# Patient Record
Sex: FEMALE | Race: WHITE | NOT HISPANIC OR LATINO
[De-identification: names, ages, dates, MRNs, and addresses within clinical notes are randomized per-mention and may not be internally consistent; named-entity substitution may affect disease eponyms.]

---

## 2017-10-19 ENCOUNTER — CHARTING TRANS (OUTPATIENT)
Dept: OTHER | Age: 27
End: 2017-10-19

## 2017-10-22 ENCOUNTER — CHARTING TRANS (OUTPATIENT)
Dept: OTHER | Age: 27
End: 2017-10-22

## 2017-10-30 ENCOUNTER — CHARTING TRANS (OUTPATIENT)
Dept: OTHER | Age: 27
End: 2017-10-30

## 2017-11-02 ENCOUNTER — CHARTING TRANS (OUTPATIENT)
Dept: OTHER | Age: 27
End: 2017-11-02

## 2019-02-08 ENCOUNTER — WALK IN (OUTPATIENT)
Dept: URGENT CARE | Age: 29
End: 2019-02-08

## 2019-02-08 VITALS
RESPIRATION RATE: 16 BRPM | TEMPERATURE: 98.2 F | SYSTOLIC BLOOD PRESSURE: 94 MMHG | HEART RATE: 100 BPM | WEIGHT: 122 LBS | BODY MASS INDEX: 20.33 KG/M2 | DIASTOLIC BLOOD PRESSURE: 60 MMHG | HEIGHT: 65 IN

## 2019-02-08 DIAGNOSIS — J02.9 PHARYNGITIS, UNSPECIFIED ETIOLOGY: Primary | ICD-10-CM

## 2019-02-08 LAB
INTERNAL PROCEDURAL CONTROLS ACCEPTABLE: YES
S PYO AG THROAT QL IA.RAPID: NEGATIVE

## 2019-02-08 PROCEDURE — X0943 AMG SELF PAY VISIT: HCPCS | Performed by: NURSE PRACTITIONER

## 2019-02-08 RX ORDER — NORETHINDRONE ACETATE AND ETHINYL ESTRADIOL 1; .02 MG/1; MG/1
1 TABLET ORAL DAILY
COMMUNITY

## 2019-02-08 ASSESSMENT — ENCOUNTER SYMPTOMS
FATIGUE: 1
COUGH: 0
CHILLS: 1
GASTROINTESTINAL NEGATIVE: 1
RESPIRATORY NEGATIVE: 1
FEVER: 1
SORE THROAT: 1
SINUS PAIN: 0
EYES NEGATIVE: 1
SINUS PRESSURE: 0
HEADACHES: 1
SWOLLEN GLANDS: 0
NAUSEA: 0
RHINORRHEA: 0

## 2022-11-14 ENCOUNTER — OFFICE VISIT (OUTPATIENT)
Dept: OBGYN CLINIC | Facility: CLINIC | Age: 32
End: 2022-11-14
Payer: COMMERCIAL

## 2022-11-14 VITALS
SYSTOLIC BLOOD PRESSURE: 120 MMHG | HEIGHT: 65 IN | BODY MASS INDEX: 22.56 KG/M2 | WEIGHT: 135.38 LBS | DIASTOLIC BLOOD PRESSURE: 72 MMHG

## 2022-11-14 DIAGNOSIS — Z32.01 PREGNANCY EXAMINATION OR TEST, POSITIVE RESULT: ICD-10-CM

## 2022-11-14 DIAGNOSIS — N83.209 CYST OF OVARY, UNSPECIFIED LATERALITY: ICD-10-CM

## 2022-11-14 DIAGNOSIS — N92.6 MISSED MENSES: Primary | ICD-10-CM

## 2022-11-14 LAB
CONTROL LINE PRESENT WITH A CLEAR BACKGROUND (YES/NO): YES YES/NO
PREGNANCY TEST, URINE: POSITIVE

## 2022-11-14 PROCEDURE — 87591 N.GONORRHOEAE DNA AMP PROB: CPT | Performed by: OBSTETRICS & GYNECOLOGY

## 2022-11-14 PROCEDURE — 87491 CHLMYD TRACH DNA AMP PROBE: CPT | Performed by: OBSTETRICS & GYNECOLOGY

## 2022-11-14 PROCEDURE — 87086 URINE CULTURE/COLONY COUNT: CPT | Performed by: OBSTETRICS & GYNECOLOGY

## 2022-11-15 LAB
C TRACH DNA SPEC QL NAA+PROBE: NEGATIVE
N GONORRHOEA DNA SPEC QL NAA+PROBE: NEGATIVE

## 2022-11-16 ENCOUNTER — MED REC SCAN ONLY (OUTPATIENT)
Dept: OBGYN CLINIC | Facility: CLINIC | Age: 32
End: 2022-11-16

## 2022-11-16 DIAGNOSIS — Z34.01 ENCOUNTER FOR SUPERVISION OF NORMAL FIRST PREGNANCY IN FIRST TRIMESTER: Primary | ICD-10-CM

## 2022-11-22 ENCOUNTER — NURSE ONLY (OUTPATIENT)
Dept: OBGYN CLINIC | Facility: CLINIC | Age: 32
End: 2022-11-22
Payer: COMMERCIAL

## 2022-11-22 ENCOUNTER — LAB ENCOUNTER (OUTPATIENT)
Dept: LAB | Age: 32
End: 2022-11-22
Attending: OBSTETRICS & GYNECOLOGY
Payer: COMMERCIAL

## 2022-11-22 DIAGNOSIS — Z34.01 ENCOUNTER FOR SUPERVISION OF NORMAL FIRST PREGNANCY IN FIRST TRIMESTER: ICD-10-CM

## 2022-11-22 DIAGNOSIS — Z34.01 ENCOUNTER FOR SUPERVISION OF NORMAL FIRST PREGNANCY IN FIRST TRIMESTER: Primary | ICD-10-CM

## 2022-11-22 LAB
ANTIBODY SCREEN: NEGATIVE
BASOPHILS # BLD AUTO: 0.03 X10(3) UL (ref 0–0.2)
BASOPHILS NFR BLD AUTO: 0.3 %
DEPRECATED RDW RBC AUTO: 39.7 FL (ref 35.1–46.3)
EOSINOPHIL # BLD AUTO: 0.1 X10(3) UL (ref 0–0.7)
EOSINOPHIL NFR BLD AUTO: 1.1 %
ERYTHROCYTE [DISTWIDTH] IN BLOOD BY AUTOMATED COUNT: 12 % (ref 11–15)
GLUCOSE 1H P GLC SERPL-MCNC: 126 MG/DL
HBV SURFACE AG SER-ACNC: <0.1 [IU]/L
HBV SURFACE AG SERPL QL IA: NONREACTIVE
HCT VFR BLD AUTO: 35.5 %
HCV AB SERPL QL IA: NONREACTIVE
HGB BLD-MCNC: 12.1 G/DL
IMM GRANULOCYTES # BLD AUTO: 0.04 X10(3) UL (ref 0–1)
IMM GRANULOCYTES NFR BLD: 0.5 %
LYMPHOCYTES # BLD AUTO: 1.97 X10(3) UL (ref 1–4)
LYMPHOCYTES NFR BLD AUTO: 22.5 %
MCH RBC QN AUTO: 30.7 PG (ref 26–34)
MCHC RBC AUTO-ENTMCNC: 34.1 G/DL (ref 31–37)
MCV RBC AUTO: 90.1 FL
MONOCYTES # BLD AUTO: 0.37 X10(3) UL (ref 0.1–1)
MONOCYTES NFR BLD AUTO: 4.2 %
NEUTROPHILS # BLD AUTO: 6.25 X10 (3) UL (ref 1.5–7.7)
NEUTROPHILS # BLD AUTO: 6.25 X10(3) UL (ref 1.5–7.7)
NEUTROPHILS NFR BLD AUTO: 71.4 %
PLATELET # BLD AUTO: 293 10(3)UL (ref 150–450)
RBC # BLD AUTO: 3.94 X10(6)UL
RH BLOOD TYPE: POSITIVE
RUBV IGG SER QL: POSITIVE
RUBV IGG SER-ACNC: >500 IU/ML (ref 10–?)
WBC # BLD AUTO: 8.8 X10(3) UL (ref 4–11)

## 2022-11-22 PROCEDURE — 87389 HIV-1 AG W/HIV-1&-2 AB AG IA: CPT

## 2022-11-22 PROCEDURE — 83020 HEMOGLOBIN ELECTROPHORESIS: CPT

## 2022-11-22 PROCEDURE — 87086 URINE CULTURE/COLONY COUNT: CPT

## 2022-11-22 PROCEDURE — 85025 COMPLETE CBC W/AUTO DIFF WBC: CPT

## 2022-11-22 PROCEDURE — 86803 HEPATITIS C AB TEST: CPT

## 2022-11-22 PROCEDURE — 83021 HEMOGLOBIN CHROMOTOGRAPHY: CPT

## 2022-11-22 PROCEDURE — 86780 TREPONEMA PALLIDUM: CPT

## 2022-11-22 PROCEDURE — 86901 BLOOD TYPING SEROLOGIC RH(D): CPT

## 2022-11-22 PROCEDURE — 86850 RBC ANTIBODY SCREEN: CPT

## 2022-11-22 PROCEDURE — 86592 SYPHILIS TEST NON-TREP QUAL: CPT

## 2022-11-22 PROCEDURE — 36415 COLL VENOUS BLD VENIPUNCTURE: CPT

## 2022-11-22 PROCEDURE — 87340 HEPATITIS B SURFACE AG IA: CPT

## 2022-11-22 PROCEDURE — 82950 GLUCOSE TEST: CPT

## 2022-11-22 PROCEDURE — 86900 BLOOD TYPING SEROLOGIC ABO: CPT

## 2022-11-22 PROCEDURE — 86762 RUBELLA ANTIBODY: CPT

## 2022-11-22 RX ORDER — MULTIVITAMIN WITH IRON
250 TABLET ORAL
COMMUNITY

## 2022-11-22 RX ORDER — CHOLECALCIFEROL (VITAMIN D3) 25 MCG
1 TABLET,CHEWABLE ORAL DAILY
COMMUNITY

## 2022-11-22 RX ORDER — MELATONIN
1000 DAILY
COMMUNITY

## 2022-11-22 NOTE — PROGRESS NOTES
Pt is a   here today for [employer]. Pregnancy Confirmation apt with:  with MA    LMP:      US:  (9w5d)    Working TIMOTHY: 23    Pre  BMI: 21.63    Medical Hx significant for: cervical ectropion    Obstetrical Hx significant for: NA    Surgical Hx significant for: right knee surgery ()    EPDS score: 5    Early GTT screening: meets criteria, GTT ordered. (Pt's dad is diabetic.) Drawn     Preeclampsia prevention screening: does not meet criteria. OUD Screening: Patient has answered NO to 5p questions and has no  risk factors. Patient given \"What Pregnant Women Need to Know\" handout. Educational material reviewed with patient: Prenatal care, nutrition, weight gain recommendations, travel, exercise, intercourse, pregnancy changes, safe medications, pregnancy and work, fetal movement, labor and  labor, warning signs, food safety, tdap, cord blood, breastfeeding, circumcision, and Group B strep. Pt agrees to blood transfusion if needed: Yes    PN labs ordered: Yes; drawn     Optional genetic screening discussed. Pt desires foresight and prequel: drawn on     [facility] Media Policy: Reviewed and verbalized understanding.      NOB appt:  with MA    Lab appt:

## 2022-11-23 LAB
HGB A2 MFR BLD: 2.9 % (ref 1.5–3.5)
HGB PNL BLD ELPH: 97.1 % (ref 95.5–100)

## 2022-11-26 LAB — RAPID PLASMA REAGIN (RPR): NON REACTIVE

## 2022-11-27 LAB — TREPONEMA PALLIDUM AB BY TP-PA: NON REACTIVE

## 2022-11-30 DIAGNOSIS — Z34.01 ENCOUNTER FOR SUPERVISION OF NORMAL FIRST PREGNANCY IN FIRST TRIMESTER: Primary | ICD-10-CM

## 2022-12-01 ENCOUNTER — TELEPHONE (OUTPATIENT)
Dept: OBGYN CLINIC | Facility: CLINIC | Age: 32
End: 2022-12-01

## 2022-12-01 NOTE — TELEPHONE ENCOUNTER
Call transferred, informed pt of + Pompe, biotinidase and +carnitine. Informed  can be tested with Saliva kit here at [place] [address]. Per pt  is coming to visit on Monday. Informed pt of neg trisomies/microdeletions, gender (male) in envelope. Pt agrees.

## 2022-12-05 ENCOUNTER — MED REC SCAN ONLY (OUTPATIENT)
Dept: OBGYN CLINIC | Facility: CLINIC | Age: 32
End: 2022-12-05

## 2022-12-05 ENCOUNTER — INITIAL PRENATAL (OUTPATIENT)
Dept: OBGYN CLINIC | Facility: CLINIC | Age: 32
End: 2022-12-05
Payer: COMMERCIAL

## 2022-12-05 VITALS
DIASTOLIC BLOOD PRESSURE: 72 MMHG | HEIGHT: 65 IN | BODY MASS INDEX: 22.96 KG/M2 | WEIGHT: 137.81 LBS | SYSTOLIC BLOOD PRESSURE: 120 MMHG

## 2022-12-05 DIAGNOSIS — Z36.9 UNSPECIFIED ANTENATAL SCREENING: Primary | ICD-10-CM

## 2022-12-05 DIAGNOSIS — Z14.8 GENETIC CARRIER OF OTHER DISEASE: ICD-10-CM

## 2022-12-05 LAB
GLUCOSE (URINE DIPSTICK): NEGATIVE MG/DL
MULTISTIX LOT#: NORMAL NUMERIC
PROTEIN (URINE DIPSTICK): NEGATIVE MG/DL

## 2022-12-15 ENCOUNTER — PATIENT MESSAGE (OUTPATIENT)
Dept: OBGYN CLINIC | Facility: CLINIC | Age: 32
End: 2022-12-15

## 2022-12-15 DIAGNOSIS — Z14.8 GENETIC CARRIER: Primary | ICD-10-CM

## 2022-12-16 ENCOUNTER — TELEPHONE (OUTPATIENT)
Dept: GENETICS | Facility: HOSPITAL | Age: 32
End: 2022-12-16

## 2022-12-16 NOTE — TELEPHONE ENCOUNTER
CHRISTIANM for patient advising I canceled her appointment.   I advised her to call to reschedule with Karlos Carrion or Rose Marie Romero

## 2022-12-19 ENCOUNTER — PATIENT MESSAGE (OUTPATIENT)
Dept: OBGYN CLINIC | Facility: CLINIC | Age: 32
End: 2022-12-19

## 2022-12-19 NOTE — TELEPHONE ENCOUNTER
From: Yoshi Romo  To: Feli Timmons MD  Sent: 12/19/2022 9:53 AM CST  Subject: COVID Positive     Good morning,     Last night I started feeling chills and body aches. This morning I took an at-home COVID test and it is positive. My temperature is currently 99.7. Can you please advise on what medication I should take and when, such as how high my fever should be before taking something? I have not taken any medication yet. Thank you!

## 2023-01-03 ENCOUNTER — LAB ENCOUNTER (OUTPATIENT)
Dept: LAB | Facility: REFERENCE LAB | Age: 33
End: 2023-01-03
Attending: OBSTETRICS & GYNECOLOGY
Payer: COMMERCIAL

## 2023-01-03 ENCOUNTER — HOSPITAL ENCOUNTER (OUTPATIENT)
Dept: ULTRASOUND IMAGING | Age: 33
Discharge: HOME | End: 2023-01-03
Attending: OBSTETRICS & GYNECOLOGY
Payer: COMMERCIAL

## 2023-01-03 ENCOUNTER — ROUTINE PRENATAL (OUTPATIENT)
Dept: OBGYN CLINIC | Facility: CLINIC | Age: 33
End: 2023-01-03
Payer: COMMERCIAL

## 2023-01-03 VITALS
BODY MASS INDEX: 23.49 KG/M2 | SYSTOLIC BLOOD PRESSURE: 120 MMHG | HEIGHT: 65 IN | WEIGHT: 141 LBS | DIASTOLIC BLOOD PRESSURE: 64 MMHG

## 2023-01-03 DIAGNOSIS — N83.209 CYST OF OVARY, UNSPECIFIED LATERALITY: ICD-10-CM

## 2023-01-03 DIAGNOSIS — Z36.9 UNSPECIFIED ANTENATAL SCREENING: ICD-10-CM

## 2023-01-03 DIAGNOSIS — Z34.02 ENCOUNTER FOR SUPERVISION OF NORMAL FIRST PREGNANCY IN SECOND TRIMESTER: Primary | ICD-10-CM

## 2023-01-03 DIAGNOSIS — Z34.02 ENCOUNTER FOR SUPERVISION OF NORMAL FIRST PREGNANCY IN SECOND TRIMESTER: ICD-10-CM

## 2023-01-03 PROBLEM — U07.1 COVID-19 AFFECTING PREGNANCY IN SECOND TRIMESTER (HCC): Status: ACTIVE | Noted: 2023-01-03

## 2023-01-03 PROBLEM — O98.512 COVID-19 AFFECTING PREGNANCY IN SECOND TRIMESTER (HCC): Status: ACTIVE | Noted: 2023-01-03

## 2023-01-03 PROBLEM — O98.512 COVID-19 AFFECTING PREGNANCY IN SECOND TRIMESTER: Status: ACTIVE | Noted: 2023-01-03

## 2023-01-03 PROBLEM — U07.1 COVID-19 AFFECTING PREGNANCY IN SECOND TRIMESTER: Status: ACTIVE | Noted: 2023-01-03

## 2023-01-03 PROCEDURE — 3008F BODY MASS INDEX DOCD: CPT | Performed by: OBSTETRICS & GYNECOLOGY

## 2023-01-03 PROCEDURE — 36415 COLL VENOUS BLD VENIPUNCTURE: CPT

## 2023-01-03 PROCEDURE — 82105 ALPHA-FETOPROTEIN SERUM: CPT

## 2023-01-03 PROCEDURE — 86780 TREPONEMA PALLIDUM: CPT

## 2023-01-03 PROCEDURE — 76815 OB US LIMITED FETUS(S): CPT | Performed by: OBSTETRICS & GYNECOLOGY

## 2023-01-03 PROCEDURE — 3074F SYST BP LT 130 MM HG: CPT | Performed by: OBSTETRICS & GYNECOLOGY

## 2023-01-03 PROCEDURE — 3078F DIAST BP <80 MM HG: CPT | Performed by: OBSTETRICS & GYNECOLOGY

## 2023-01-04 LAB — T PALLIDUM AB SER QL: NEGATIVE

## 2023-01-06 LAB
AFP SMOKING: NO
FAMILY HX NEURAL TUBE DEFECT: NO
INSULIN REQ MATERNAL DIABETES: NO
MATERNAL AGE OF DELIVERY: 32.8 YR
MOM FOR AFP: 1.55
PATIENT'S AFP: 60 NG/ML

## 2023-01-20 PROBLEM — Z14.8 GENETIC CARRIER OF OTHER DISEASE: Status: RESOLVED | Noted: 2022-12-05 | Resolved: 2023-01-20

## 2023-01-23 ENCOUNTER — APPOINTMENT (OUTPATIENT)
Dept: HEMATOLOGY/ONCOLOGY | Facility: HOSPITAL | Age: 33
End: 2023-01-23
Attending: GENETIC COUNSELOR, MS
Payer: COMMERCIAL

## 2023-01-23 ENCOUNTER — APPOINTMENT (OUTPATIENT)
Dept: GENETICS | Facility: HOSPITAL | Age: 33
End: 2023-01-23
Attending: GENETIC COUNSELOR, MS
Payer: COMMERCIAL

## 2023-02-02 ENCOUNTER — ULTRASOUND ENCOUNTER (OUTPATIENT)
Dept: OBGYN CLINIC | Facility: CLINIC | Age: 33
End: 2023-02-02
Payer: COMMERCIAL

## 2023-02-02 ENCOUNTER — ROUTINE PRENATAL (OUTPATIENT)
Dept: OBGYN CLINIC | Facility: CLINIC | Age: 33
End: 2023-02-02
Payer: COMMERCIAL

## 2023-02-02 VITALS
WEIGHT: 147 LBS | BODY MASS INDEX: 24.49 KG/M2 | SYSTOLIC BLOOD PRESSURE: 114 MMHG | DIASTOLIC BLOOD PRESSURE: 68 MMHG | HEIGHT: 65 IN

## 2023-02-02 DIAGNOSIS — Z34.02 ENCOUNTER FOR SUPERVISION OF NORMAL FIRST PREGNANCY IN SECOND TRIMESTER: ICD-10-CM

## 2023-02-02 DIAGNOSIS — Z34.02 ENCOUNTER FOR SUPERVISION OF NORMAL FIRST PREGNANCY IN SECOND TRIMESTER: Primary | ICD-10-CM

## 2023-02-02 PROBLEM — Z14.8 GENETIC CARRIER: Status: ACTIVE | Noted: 2022-12-05

## 2023-02-02 PROCEDURE — 3008F BODY MASS INDEX DOCD: CPT | Performed by: OBSTETRICS & GYNECOLOGY

## 2023-02-02 PROCEDURE — 76817 TRANSVAGINAL US OBSTETRIC: CPT | Performed by: OBSTETRICS & GYNECOLOGY

## 2023-02-02 PROCEDURE — 76805 OB US >/= 14 WKS SNGL FETUS: CPT | Performed by: OBSTETRICS & GYNECOLOGY

## 2023-02-02 PROCEDURE — 3078F DIAST BP <80 MM HG: CPT | Performed by: OBSTETRICS & GYNECOLOGY

## 2023-02-02 PROCEDURE — 3074F SYST BP LT 130 MM HG: CPT | Performed by: OBSTETRICS & GYNECOLOGY

## 2023-02-02 PROCEDURE — 81002 URINALYSIS NONAUTO W/O SCOPE: CPT | Performed by: OBSTETRICS & GYNECOLOGY

## 2023-02-02 NOTE — PROGRESS NOTES
28year old  at 23w11d     Contractions: No  VB: No  LOF: No  Fetal movement: Yes    Return OB  Pre- Care: UTD.   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

## 2023-03-01 ENCOUNTER — LAB ENCOUNTER (OUTPATIENT)
Dept: LAB | Facility: REFERENCE LAB | Age: 33
End: 2023-03-01
Attending: OBSTETRICS & GYNECOLOGY
Payer: COMMERCIAL

## 2023-03-01 ENCOUNTER — ROUTINE PRENATAL (OUTPATIENT)
Dept: OBGYN CLINIC | Facility: CLINIC | Age: 33
End: 2023-03-01
Payer: COMMERCIAL

## 2023-03-01 VITALS
HEIGHT: 65 IN | BODY MASS INDEX: 25.34 KG/M2 | DIASTOLIC BLOOD PRESSURE: 62 MMHG | WEIGHT: 152.13 LBS | SYSTOLIC BLOOD PRESSURE: 118 MMHG

## 2023-03-01 DIAGNOSIS — Z34.02 ENCOUNTER FOR SUPERVISION OF NORMAL FIRST PREGNANCY IN SECOND TRIMESTER: ICD-10-CM

## 2023-03-01 DIAGNOSIS — Z34.02 ENCOUNTER FOR SUPERVISION OF NORMAL FIRST PREGNANCY IN SECOND TRIMESTER: Primary | ICD-10-CM

## 2023-03-01 LAB
BASOPHILS # BLD AUTO: 0.04 X10(3) UL (ref 0–0.2)
BASOPHILS NFR BLD AUTO: 0.4 %
DEPRECATED RDW RBC AUTO: 42.8 FL (ref 35.1–46.3)
EOSINOPHIL # BLD AUTO: 0.17 X10(3) UL (ref 0–0.7)
EOSINOPHIL NFR BLD AUTO: 1.7 %
ERYTHROCYTE [DISTWIDTH] IN BLOOD BY AUTOMATED COUNT: 12.9 % (ref 11–15)
GLUCOSE (URINE DIPSTICK): NEGATIVE MG/DL
GLUCOSE 1H P GLC SERPL-MCNC: 167 MG/DL
HCT VFR BLD AUTO: 32.2 %
HGB BLD-MCNC: 10.9 G/DL
IMM GRANULOCYTES # BLD AUTO: 0.08 X10(3) UL (ref 0–1)
IMM GRANULOCYTES NFR BLD: 0.8 %
LYMPHOCYTES # BLD AUTO: 2.06 X10(3) UL (ref 1–4)
LYMPHOCYTES NFR BLD AUTO: 21.1 %
MCH RBC QN AUTO: 30.6 PG (ref 26–34)
MCHC RBC AUTO-ENTMCNC: 33.9 G/DL (ref 31–37)
MCV RBC AUTO: 90.4 FL
MONOCYTES # BLD AUTO: 0.37 X10(3) UL (ref 0.1–1)
MONOCYTES NFR BLD AUTO: 3.8 %
MULTISTIX LOT#: NORMAL NUMERIC
NEUTROPHILS # BLD AUTO: 7.04 X10 (3) UL (ref 1.5–7.7)
NEUTROPHILS # BLD AUTO: 7.04 X10(3) UL (ref 1.5–7.7)
NEUTROPHILS NFR BLD AUTO: 72.2 %
PLATELET # BLD AUTO: 241 10(3)UL (ref 150–450)
PROTEIN (URINE DIPSTICK): NEGATIVE MG/DL
RBC # BLD AUTO: 3.56 X10(6)UL
WBC # BLD AUTO: 9.8 X10(3) UL (ref 4–11)

## 2023-03-01 PROCEDURE — 81002 URINALYSIS NONAUTO W/O SCOPE: CPT | Performed by: OBSTETRICS & GYNECOLOGY

## 2023-03-01 PROCEDURE — 3078F DIAST BP <80 MM HG: CPT | Performed by: OBSTETRICS & GYNECOLOGY

## 2023-03-01 PROCEDURE — 3074F SYST BP LT 130 MM HG: CPT | Performed by: OBSTETRICS & GYNECOLOGY

## 2023-03-01 PROCEDURE — 82950 GLUCOSE TEST: CPT | Performed by: OBSTETRICS & GYNECOLOGY

## 2023-03-01 PROCEDURE — 3008F BODY MASS INDEX DOCD: CPT | Performed by: OBSTETRICS & GYNECOLOGY

## 2023-03-01 RX ORDER — BREAST PUMP
EACH MISCELLANEOUS
Qty: 1 EACH | Refills: 0 | Status: SHIPPED | OUTPATIENT
Start: 2023-03-01

## 2023-03-01 NOTE — PROGRESS NOTES
28year old  at 24w5d     Contractions: No  VB: No  LOF: No  Fetal movement: Yes    Return OB  Pre- Care: UTD.   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

## 2023-03-02 ENCOUNTER — TELEPHONE (OUTPATIENT)
Dept: OBGYN CLINIC | Facility: CLINIC | Age: 33
End: 2023-03-02

## 2023-03-02 DIAGNOSIS — Z34.82 ENCOUNTER FOR SUPERVISION OF OTHER NORMAL PREGNANCY IN SECOND TRIMESTER: Primary | ICD-10-CM

## 2023-03-02 NOTE — TELEPHONE ENCOUNTER
RN spoke to pt and sked her for 3-hr GTT on 3/3. RN provided instructions and also sent via [address] Pt verbalized understanding and agreed with POC. Lab order placed.

## 2023-03-02 NOTE — TELEPHONE ENCOUNTER
PT stated she got the results for the 1 hr glucose on her mychart and she thinks she needs to do the 3 hr glucose but would like to verify so that she can possibly get it done today if the dr puts in the order.

## 2023-03-03 ENCOUNTER — LAB ENCOUNTER (OUTPATIENT)
Dept: LAB | Age: 33
End: 2023-03-03
Attending: OBSTETRICS & GYNECOLOGY
Payer: COMMERCIAL

## 2023-03-03 LAB
GLUCOSE 1H P GLC SERPL-MCNC: 224 MG/DL
GLUCOSE 2H P GLC SERPL-MCNC: 166 MG/DL
GLUCOSE 3H P GLC SERPL-MCNC: 103 MG/DL (ref 70–140)
GLUCOSE P FAST SERPL-MCNC: 88 MG/DL

## 2023-03-03 PROCEDURE — 82952 GTT-ADDED SAMPLES: CPT | Performed by: OBSTETRICS & GYNECOLOGY

## 2023-03-03 PROCEDURE — 82951 GLUCOSE TOLERANCE TEST (GTT): CPT | Performed by: OBSTETRICS & GYNECOLOGY

## 2023-03-03 NOTE — PROGRESS NOTES
Pt contacted,  and name verified. Pt provided with lab result and msg from provider. RN told pt that GDM educator would be reaching out to set up an appt. Pt verbalized understanding and agreed with POC. GDM referral placed.

## 2023-03-08 ENCOUNTER — HOSPITAL ENCOUNTER (OUTPATIENT)
Dept: ENDOCRINOLOGY | Facility: HOSPITAL | Age: 33
Discharge: HOME | End: 2023-03-08
Attending: OBSTETRICS & GYNECOLOGY
Payer: COMMERCIAL

## 2023-03-08 VITALS — BODY MASS INDEX: 25 KG/M2 | WEIGHT: 151 LBS

## 2023-03-08 DIAGNOSIS — O24.410 DIET CONTROLLED GESTATIONAL DIABETES MELLITUS, ANTEPARTUM: Primary | ICD-10-CM

## 2023-03-08 DIAGNOSIS — O24.410 DIET CONTROLLED GESTATIONAL DIABETES MELLITUS (GDM), ANTEPARTUM: Primary | ICD-10-CM

## 2023-03-08 RX ORDER — BLOOD-GLUCOSE METER
1 EACH MISCELLANEOUS 4 TIMES DAILY
Qty: 1 KIT | Refills: 0 | COMMUNITY
Start: 2023-03-08 | End: 2024-03-07

## 2023-03-08 RX ORDER — BLOOD-GLUCOSE METER
1 EACH MISCELLANEOUS 2 TIMES DAILY
Qty: 1 KIT | Refills: 0 | COMMUNITY
Start: 2023-03-08 | End: 2024-03-07

## 2023-03-08 RX ORDER — LANCETS 33 GAUGE
1 EACH MISCELLANEOUS 4 TIMES DAILY
Qty: 200 EACH | Refills: 3 | Status: SHIPPED | OUTPATIENT
Start: 2023-03-08 | End: 2024-03-07

## 2023-03-08 RX ORDER — BLOOD SUGAR DIAGNOSTIC
STRIP MISCELLANEOUS
Qty: 200 STRIP | Refills: 2 | Status: SHIPPED | OUTPATIENT
Start: 2023-03-08 | End: 2024-03-07

## 2023-03-08 NOTE — PROGRESS NOTES
Andrez Alatorre  : 1990 was seen for Gestational Diabetes Counseling: Individual/Group    Date: 3/8/2023   Start time: 2:30pm  End time: 3:30pm  Obtained usual diet history: Patient is in center for gestational diabetes for the 1 st time. This is her 1st pregnancy. Patient states that she usually eats 2 meals a day with a snack in between. Breakfast consists of fruit and yogurt 4-5 hrs later a healthy snack then a full dinner later in the evening. Education:     GDM Overview:  Reviewed gestational diabetes as diagnosis including target blood glucose values. Benefits, risks, and management options for improving/maintaining glucose control to mother/baby discussed. Instructed /demonstrated ability to perform blood glucose testing on: contour meter      Healthy Eating:  Discussed nutrition concepts for pregnancy/healthy eating and effects of food on BG value. Timing of meals; what is a carbohydrate, protein, fat. Taught: Carb counting, label reading, meal planning. Suggested Calorie Level: 2000    Being Active:  Benefits and effects of activity on BG discussed. Monitoring:  Instructed on how to use glucose monitor/proper lancet disposal. Testing schedules are:   Fastin-95 mg/dL, Call MD if >95 mg/dL twice in 1 week   2 Hour Post Prandial:  120 md/dL, Call MD if >120 mg/dL twice in 1 week. Reducing Risk:  Effects of elevated blood glucose on mother/baby reviewed. Discussed management (hyperglycemia, hypoglycemia, sick day, DKA, other) and when to call provider. Post pregnancy management/prevention of Type 2 DM, and increased risk of having diabetes later in life reviewed. Healthy Coping:  Family involvement/social support encouraged. Identification of lifestyle behaviors willing to change discussed. Training Tools Provided:  Printed materials covering each topic provided. Support Plan provided and reviewed. Patient Chosen Goals:      1.  Follow recommended GDM meal plan. 2. Begin testing fasting glucose and 2 hours after meals   3. Bring glucose log to each MD office visit. 4. Encouraged activity if no restrictions. 5. Encouraged Ovidio Hutchison to call diabetes center with any questions or concerns. Patient verbalized understanding and has no further questions at this time.     Gissel Maria RN

## 2023-03-22 ENCOUNTER — APPOINTMENT (OUTPATIENT)
Dept: ENDOCRINOLOGY | Facility: HOSPITAL | Age: 33
End: 2023-03-22
Attending: OBSTETRICS & GYNECOLOGY
Payer: COMMERCIAL

## 2023-03-27 ENCOUNTER — ROUTINE PRENATAL (OUTPATIENT)
Dept: OBGYN CLINIC | Facility: CLINIC | Age: 33
End: 2023-03-27
Payer: COMMERCIAL

## 2023-03-27 VITALS
WEIGHT: 154.81 LBS | SYSTOLIC BLOOD PRESSURE: 116 MMHG | HEIGHT: 65 IN | BODY MASS INDEX: 25.79 KG/M2 | DIASTOLIC BLOOD PRESSURE: 66 MMHG

## 2023-03-27 DIAGNOSIS — Z34.82 ENCOUNTER FOR SUPERVISION OF OTHER NORMAL PREGNANCY IN SECOND TRIMESTER: Primary | ICD-10-CM

## 2023-03-27 DIAGNOSIS — O24.410 DIET CONTROLLED GESTATIONAL DIABETES MELLITUS (GDM) IN THIRD TRIMESTER: ICD-10-CM

## 2023-03-27 PROCEDURE — 3008F BODY MASS INDEX DOCD: CPT | Performed by: OBSTETRICS & GYNECOLOGY

## 2023-03-27 PROCEDURE — 3074F SYST BP LT 130 MM HG: CPT | Performed by: OBSTETRICS & GYNECOLOGY

## 2023-03-27 PROCEDURE — 3078F DIAST BP <80 MM HG: CPT | Performed by: OBSTETRICS & GYNECOLOGY

## 2023-03-27 NOTE — PROGRESS NOTES
Doing well. No OB complaints. +FM. Reviewed home glucose numbers from [facility]. Mostly normal control with one elevated fasting and few elevated post prandials. Rec against continuing to use Freestyle Lety and to check sugars fasting and 2 hour postprandial due to studies showing increased use of insulin. Pt understood and will start checking blood glucoses. Tdap today. Discussed soft recommendation for family to have 2 Tdap. Growth US at 32 weeks ordered. ANDREW 2 weeks. Dr. Dimas Justice MD    [facility] 10 OBGYN     This note was created by [facility] voice recognition. Errors in content may be related to improper recognition by the system; efforts to review and correct have been done but errors may still exist. Please be advised the primary purpose of this note is for me to communicate medical care. Standard sentence structure is not always used. Medical terminology and medical abbreviations may be used. There may be grammatical, typographical, and automated fill ins that may have errors missed in proofreading.

## 2023-03-28 ENCOUNTER — HOSPITAL ENCOUNTER (OUTPATIENT)
Dept: ENDOCRINOLOGY | Facility: HOSPITAL | Age: 33
Discharge: HOME | End: 2023-03-28
Attending: OBSTETRICS & GYNECOLOGY
Payer: COMMERCIAL

## 2023-03-28 VITALS — WEIGHT: 154.69 LBS | BODY MASS INDEX: 26 KG/M2

## 2023-03-28 DIAGNOSIS — O24.410 DIET CONTROLLED GESTATIONAL DIABETES MELLITUS (GDM), ANTEPARTUM: Primary | ICD-10-CM

## 2023-03-28 NOTE — PROGRESS NOTES
Ajay Bullard  : 1990 was seen for GDM  Individual Follow-Up Counseling    Date: 3/28/2023   Start time: 1100  End time: 1130    Assessment: LMP 2022   Weight: Wt Readings from Last 6 Encounters:  23 : 154 lb 12.8 oz  23 : 151 lb  23 : 152 lb 1.6 oz  23 : 147 lb  23 : 141 lb  22 : 137 lb 12.8 oz      BG over past 10 days:    FB-92 (1 at 96). 2 hr PP: B: 76, 90, 103, 111  2 hr L:   2 hr D:     Gestational Diabetes goals assessed by patient: 4 - frequently, continue with current goals/plan       Diet:     Following meal plan: At times, carb intake seems to be below carb targets of 175 grams per day for fetal brain development    Skips: HS Snack  Meals are Balanced. Carb Intake may be inadequate at some meals  Food Selections are healthy. Exercise:     Walking on treadmill for 15-30 minutes 3-4 times a week. Education:     GDM Overview:  Reviewed target blood glucose values for GDM. Discussed benefits/risks to mother/baby management options to improve/maintain glucose control. Healthy Eating:  Reviewed/Reinforced:  Nutrition concepts for pregnancy/healthy eating and effect of food on blood glucose. Meal planning process and benefits of pre-planning meals/snacks. Appropriate timing of meals/snacks. Carb counting. Additional concepts: Discussed minimum recommended carb intake of 175 grams per day for fetal brain development    Being Active:  Reviewed benefits of effects of activity on BG values. Reviewed types of activity, duration, precautions. Monitoring:  Instructed to report readings to MD as directed. Call MD: if FBG is > 95 twice in 1 week. If 2 hr PP is > 120 twice in 1 week at any one meal.  Call Diabetic Educator is ketones are consistently elevated. Taking Medication:  Reviewed appropriate timing (if on insulin) of meds. Reviewed probability of needing medication adjustments throughout pregnancy. Reducing Risk:  Discussed management of (hyperglycemia, hypoglycemia) and when to call provider. Recommendations:      1. Follow recommended meal plan. 2. Test blood glucose and ketones as directed. 3. Bring glucose log to each MD visit. 4. Start/continue activity if no restrictions. 5. Additional recommendations: Count carbs and total up at meals and snacks and for the day to be sure you are meeting minimum recommended carb intake of 175 grams per day. Patient verbalized understanding and has no further questions at this time.       Harvey Cuello RD

## 2023-03-30 ENCOUNTER — MED REC SCAN ONLY (OUTPATIENT)
Dept: OBGYN CLINIC | Facility: CLINIC | Age: 33
End: 2023-03-30

## 2023-04-04 ENCOUNTER — TELEPHONE (OUTPATIENT)
Dept: OBGYN CLINIC | Facility: CLINIC | Age: 33
End: 2023-04-04

## 2023-04-10 ENCOUNTER — ROUTINE PRENATAL (OUTPATIENT)
Dept: OBGYN CLINIC | Facility: CLINIC | Age: 33
End: 2023-04-10
Payer: COMMERCIAL

## 2023-04-10 VITALS
DIASTOLIC BLOOD PRESSURE: 78 MMHG | SYSTOLIC BLOOD PRESSURE: 102 MMHG | WEIGHT: 155 LBS | BODY MASS INDEX: 25.83 KG/M2 | HEIGHT: 65 IN

## 2023-04-10 DIAGNOSIS — Z34.03 ENCOUNTER FOR SUPERVISION OF NORMAL FIRST PREGNANCY IN THIRD TRIMESTER: Primary | ICD-10-CM

## 2023-04-10 DIAGNOSIS — O24.410 DIET CONTROLLED GESTATIONAL DIABETES MELLITUS (GDM) IN THIRD TRIMESTER: ICD-10-CM

## 2023-04-10 PROCEDURE — 3074F SYST BP LT 130 MM HG: CPT | Performed by: OBSTETRICS & GYNECOLOGY

## 2023-04-10 PROCEDURE — 3008F BODY MASS INDEX DOCD: CPT | Performed by: OBSTETRICS & GYNECOLOGY

## 2023-04-10 PROCEDURE — 3078F DIAST BP <80 MM HG: CPT | Performed by: OBSTETRICS & GYNECOLOGY

## 2023-04-10 PROCEDURE — 81002 URINALYSIS NONAUTO W/O SCOPE: CPT | Performed by: OBSTETRICS & GYNECOLOGY

## 2023-04-10 NOTE — PROGRESS NOTES
28year old  at 30w3d     Contractions: No  VB: No  LOF: No  Fetal movement: Yes    Glucose over the past week - using traditional glucose meter with finger-pricks  Fasting only on was 97 (after poor sleep)  postprandial range     Return OB  Pre- Care: UTD  - has anatomy US 2 weeks    GDMA1  - glucose log shows excellent control, we discussed how the CGM have not been studied during pregnancy, so we don't entirely know how it affects management and outcomes for gestational diabetes.     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

## 2023-04-11 DIAGNOSIS — O24.410 DIET CONTROLLED GESTATIONAL DIABETES MELLITUS (GDM), ANTEPARTUM: ICD-10-CM

## 2023-04-11 RX ORDER — BLOOD SUGAR DIAGNOSTIC
STRIP MISCELLANEOUS
Qty: 200 STRIP | Refills: 2 | Status: SHIPPED | OUTPATIENT
Start: 2023-04-11 | End: 2024-04-10

## 2023-04-13 ENCOUNTER — TELEPHONE (OUTPATIENT)
Dept: OBGYN CLINIC | Facility: CLINIC | Age: 33
End: 2023-04-13

## 2023-04-24 ENCOUNTER — ROUTINE PRENATAL (OUTPATIENT)
Dept: OBGYN CLINIC | Facility: CLINIC | Age: 33
End: 2023-04-24
Payer: COMMERCIAL

## 2023-04-24 ENCOUNTER — TELEPHONE (OUTPATIENT)
Dept: OBGYN CLINIC | Facility: CLINIC | Age: 33
End: 2023-04-24

## 2023-04-24 ENCOUNTER — ULTRASOUND ENCOUNTER (OUTPATIENT)
Dept: OBGYN CLINIC | Facility: CLINIC | Age: 33
End: 2023-04-24
Payer: COMMERCIAL

## 2023-04-24 VITALS
DIASTOLIC BLOOD PRESSURE: 70 MMHG | HEIGHT: 65 IN | SYSTOLIC BLOOD PRESSURE: 108 MMHG | WEIGHT: 156.5 LBS | BODY MASS INDEX: 26.08 KG/M2

## 2023-04-24 DIAGNOSIS — O24.410 DIET CONTROLLED GESTATIONAL DIABETES MELLITUS (GDM) IN THIRD TRIMESTER: ICD-10-CM

## 2023-04-24 DIAGNOSIS — Z36.9 UNSPECIFIED ANTENATAL SCREENING: Primary | ICD-10-CM

## 2023-04-24 NOTE — PROGRESS NOTES
Doing well. No OB complaints. +FM. Reviewed normal growth US today. BG well controlled. Reviewed prenatal labs and visits going forward. ANDREW 2 weeks. Dr. Sugey Mariano MD    [place] [address] OBGYN     This note was created by [facility] voice recognition. Errors in content may be related to improper recognition by the system; efforts to review and correct have been done but errors may still exist. Please be advised the primary purpose of this note is for me to communicate medical care. Standard sentence structure is not always used. Medical terminology and medical abbreviations may be used. There may be grammatical, typographical, and automated fill ins that may have errors missed in proofreading.

## 2023-05-08 ENCOUNTER — ROUTINE PRENATAL (OUTPATIENT)
Dept: OBGYN CLINIC | Facility: CLINIC | Age: 33
End: 2023-05-08
Payer: COMMERCIAL

## 2023-05-08 ENCOUNTER — LAB ENCOUNTER (OUTPATIENT)
Dept: LAB | Facility: HOSPITAL | Age: 33
End: 2023-05-08
Attending: OBSTETRICS & GYNECOLOGY
Payer: COMMERCIAL

## 2023-05-08 VITALS
SYSTOLIC BLOOD PRESSURE: 105 MMHG | DIASTOLIC BLOOD PRESSURE: 72 MMHG | BODY MASS INDEX: 26.66 KG/M2 | WEIGHT: 160 LBS | HEIGHT: 65 IN | HEART RATE: 91 BPM

## 2023-05-08 DIAGNOSIS — Z3A.34 34 WEEKS GESTATION OF PREGNANCY: ICD-10-CM

## 2023-05-08 DIAGNOSIS — Z3A.34 34 WEEKS GESTATION OF PREGNANCY: Primary | ICD-10-CM

## 2023-05-08 LAB
GLUCOSE (URINE DIPSTICK): NEGATIVE MG/DL
MULTISTIX EXPIRATION DATE: NORMAL DATE
MULTISTIX LOT#: NORMAL NUMERIC
PROTEIN (URINE DIPSTICK): NEGATIVE MG/DL

## 2023-05-08 PROCEDURE — 86780 TREPONEMA PALLIDUM: CPT | Performed by: OBSTETRICS & GYNECOLOGY

## 2023-05-08 PROCEDURE — 87389 HIV-1 AG W/HIV-1&-2 AB AG IA: CPT | Performed by: OBSTETRICS & GYNECOLOGY

## 2023-05-08 NOTE — PROGRESS NOTES
28year old  at 34w3d     Contractions: No  VB: No  LOF: No  Fetal movement: Yes    Accuchecks - all WNL    Return OB  Pre-Maxine Care: up to date. GBS next visit. HIV, trep orders placed.    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

## 2023-05-10 ENCOUNTER — HOSPITAL ENCOUNTER (INPATIENT)
Facility: HOSPITAL | Age: 33
LOS: 3 days | Discharge: HOME | End: 2023-05-13
Attending: OBSTETRICS & GYNECOLOGY | Admitting: OBSTETRICS & GYNECOLOGY
Payer: COMMERCIAL

## 2023-05-10 ENCOUNTER — TELEPHONE (OUTPATIENT)
Dept: OBGYN CLINIC | Facility: CLINIC | Age: 33
End: 2023-05-10

## 2023-05-10 PROBLEM — O42.90 PROM (PREMATURE RUPTURE OF MEMBRANES): Status: ACTIVE | Noted: 2023-05-10

## 2023-05-10 PROBLEM — O42.90 PROM (PREMATURE RUPTURE OF MEMBRANES) (HCC): Status: ACTIVE | Noted: 2023-05-10

## 2023-05-10 LAB
ANTIBODY SCREEN: NEGATIVE
BASOPHILS # BLD AUTO: 0.05 X10(3) UL (ref 0–0.2)
BASOPHILS NFR BLD AUTO: 0.4 %
DEPRECATED RDW RBC AUTO: 41.4 FL (ref 35.1–46.3)
EOSINOPHIL # BLD AUTO: 0.12 X10(3) UL (ref 0–0.7)
EOSINOPHIL NFR BLD AUTO: 1.1 %
ERYTHROCYTE [DISTWIDTH] IN BLOOD BY AUTOMATED COUNT: 13 % (ref 11–15)
GLUCOSE BLDC GLUCOMTR-MCNC: 79 MG/DL (ref 70–99)
HCT VFR BLD AUTO: 34 %
HGB BLD-MCNC: 11.8 G/DL
IMM GRANULOCYTES # BLD AUTO: 0.2 X10(3) UL (ref 0–1)
IMM GRANULOCYTES NFR BLD: 1.8 %
LYMPHOCYTES # BLD AUTO: 1.54 X10(3) UL (ref 1–4)
LYMPHOCYTES NFR BLD AUTO: 13.6 %
MCH RBC QN AUTO: 30.6 PG (ref 26–34)
MCHC RBC AUTO-ENTMCNC: 34.7 G/DL (ref 31–37)
MCV RBC AUTO: 88.1 FL
MONOCYTES # BLD AUTO: 0.57 X10(3) UL (ref 0.1–1)
MONOCYTES NFR BLD AUTO: 5 %
NEUTROPHILS # BLD AUTO: 8.87 X10 (3) UL (ref 1.5–7.7)
NEUTROPHILS # BLD AUTO: 8.87 X10(3) UL (ref 1.5–7.7)
NEUTROPHILS NFR BLD AUTO: 78.1 %
PLATELET # BLD AUTO: 184 10(3)UL (ref 150–450)
RBC # BLD AUTO: 3.86 X10(6)UL
RH BLOOD TYPE: POSITIVE
T PALLIDUM AB SER QL: NEGATIVE
WBC # BLD AUTO: 11.4 X10(3) UL (ref 4–11)

## 2023-05-10 RX ORDER — LIDOCAINE HYDROCHLORIDE 10 MG/ML
30 INJECTION, SOLUTION EPIDURAL; INFILTRATION; INTRACAUDAL; PERINEURAL ONCE
Status: DISCONTINUED | OUTPATIENT
Start: 2023-05-10 | End: 2023-05-11 | Stop reason: HOSPADM

## 2023-05-10 RX ORDER — BETAMETHASONE SODIUM PHOSPHATE AND BETAMETHASONE ACETATE 3; 3 MG/ML; MG/ML
12 INJECTION, SUSPENSION INTRA-ARTICULAR; INTRALESIONAL; INTRAMUSCULAR; SOFT TISSUE EVERY 24 HOURS
Status: COMPLETED | OUTPATIENT
Start: 2023-05-10 | End: 2023-05-11

## 2023-05-10 RX ORDER — ACETAMINOPHEN 500 MG
500 TABLET ORAL EVERY 6 HOURS PRN
Status: DISCONTINUED | OUTPATIENT
Start: 2023-05-10 | End: 2023-05-11 | Stop reason: HOSPADM

## 2023-05-10 RX ORDER — TERBUTALINE SULFATE 1 MG/ML
0.25 INJECTION, SOLUTION SUBCUTANEOUS AS NEEDED
Status: DISCONTINUED | OUTPATIENT
Start: 2023-05-10 | End: 2023-05-11 | Stop reason: HOSPADM

## 2023-05-10 RX ORDER — SODIUM CHLORIDE, SODIUM LACTATE, POTASSIUM CHLORIDE, CALCIUM CHLORIDE 600; 310; 30; 20 MG/100ML; MG/100ML; MG/100ML; MG/100ML
INJECTION, SOLUTION INTRAVENOUS AS NEEDED
Status: DISCONTINUED | OUTPATIENT
Start: 2023-05-10 | End: 2023-05-11 | Stop reason: HOSPADM

## 2023-05-10 RX ORDER — ONDANSETRON 2 MG/ML
4 INJECTION INTRAMUSCULAR; INTRAVENOUS EVERY 6 HOURS PRN
Status: DISCONTINUED | OUTPATIENT
Start: 2023-05-10 | End: 2023-05-11 | Stop reason: HOSPADM

## 2023-05-10 RX ORDER — TRISODIUM CITRATE DIHYDRATE AND CITRIC ACID MONOHYDRATE 500; 334 MG/5ML; MG/5ML
30 SOLUTION ORAL AS NEEDED
Status: DISCONTINUED | OUTPATIENT
Start: 2023-05-10 | End: 2023-05-11 | Stop reason: HOSPADM

## 2023-05-10 RX ORDER — SODIUM CHLORIDE, SODIUM LACTATE, POTASSIUM CHLORIDE, CALCIUM CHLORIDE 600; 310; 30; 20 MG/100ML; MG/100ML; MG/100ML; MG/100ML
INJECTION, SOLUTION INTRAVENOUS CONTINUOUS
Status: DISCONTINUED | OUTPATIENT
Start: 2023-05-10 | End: 2023-05-13

## 2023-05-10 RX ORDER — IBUPROFEN 600 MG/1
600 TABLET ORAL ONCE AS NEEDED
Status: ACTIVE | OUTPATIENT
Start: 2023-05-10 | End: 2023-05-10

## 2023-05-10 RX ORDER — ACETAMINOPHEN 500 MG
1000 TABLET ORAL EVERY 6 HOURS PRN
Status: DISCONTINUED | OUTPATIENT
Start: 2023-05-10 | End: 2023-05-11 | Stop reason: HOSPADM

## 2023-05-10 RX ORDER — DEXTROSE, SODIUM CHLORIDE, SODIUM LACTATE, POTASSIUM CHLORIDE, AND CALCIUM CHLORIDE 5; .6; .31; .03; .02 G/100ML; G/100ML; G/100ML; G/100ML; G/100ML
INJECTION, SOLUTION INTRAVENOUS CONTINUOUS
Status: DISCONTINUED | OUTPATIENT
Start: 2023-05-10 | End: 2023-05-11 | Stop reason: HOSPADM

## 2023-05-10 NOTE — TELEPHONE ENCOUNTER
Call transferred, pt feels gushing every min to couple mins, started 11:47 am, clear, +FM, denies vaginal bleeding and contractions. Advised pt to go to [facility].     Called [facility] informed Zulay DUNN,.    informed Dr. Brianna Melo and agrees

## 2023-05-10 NOTE — PROGRESS NOTES
Pt is a 28year old female admitted to [place] [address]. Patient presents with:  R/o Rom: Leaking fluid since 1145     Pt is  34w5d intra-uterine pregnancy. History obtained, consents signed. Oriented to room, staff, and plan of care. Respiratory failure with hypoxia

## 2023-05-10 NOTE — PROGRESS NOTES
Pt is a 28year old female admitted to [facility]. Patient presents with:  R/o Rom: Leaking fluid since 1145     Pt is  34w5d intra-uterine pregnancy. History obtained, pt. Oriented to room, staff, and plan of care.

## 2023-05-11 PROCEDURE — 59400 OBSTETRICAL CARE: CPT | Performed by: OBSTETRICS & GYNECOLOGY

## 2023-05-11 RX ORDER — IBUPROFEN 600 MG/1
600 TABLET ORAL EVERY 6 HOURS
Status: DISCONTINUED | OUTPATIENT
Start: 2023-05-11 | End: 2023-05-13

## 2023-05-11 RX ORDER — BUPIVACAINE HCL/0.9 % NACL/PF 0.25 %
5 PLASTIC BAG, INJECTION (ML) EPIDURAL AS NEEDED
Status: DISCONTINUED | OUTPATIENT
Start: 2023-05-11 | End: 2023-05-13

## 2023-05-11 RX ORDER — DOCUSATE SODIUM 100 MG/1
100 CAPSULE, LIQUID FILLED ORAL
Status: DISCONTINUED | OUTPATIENT
Start: 2023-05-11 | End: 2023-05-13

## 2023-05-11 RX ORDER — BUPIVACAINE HYDROCHLORIDE 2.5 MG/ML
20 INJECTION, SOLUTION EPIDURAL; INFILTRATION; INTRACAUDAL ONCE
Status: DISCONTINUED | OUTPATIENT
Start: 2023-05-11 | End: 2023-05-11 | Stop reason: HOSPADM

## 2023-05-11 RX ORDER — DIAPER,BRIEF,INFANT-TODD,DISP
1 EACH MISCELLANEOUS EVERY 6 HOURS PRN
Status: DISCONTINUED | OUTPATIENT
Start: 2023-05-11 | End: 2023-05-13

## 2023-05-11 RX ORDER — ONDANSETRON 2 MG/ML
4 INJECTION INTRAMUSCULAR; INTRAVENOUS EVERY 6 HOURS PRN
Status: DISCONTINUED | OUTPATIENT
Start: 2023-05-11 | End: 2023-05-13

## 2023-05-11 RX ORDER — NALBUPHINE HYDROCHLORIDE 10 MG/ML
2.5 INJECTION, SOLUTION INTRAMUSCULAR; INTRAVENOUS; SUBCUTANEOUS
Status: DISCONTINUED | OUTPATIENT
Start: 2023-05-11 | End: 2023-05-13

## 2023-05-11 RX ORDER — BISACODYL 10 MG
10 SUPPOSITORY, RECTAL RECTAL ONCE AS NEEDED
Status: DISCONTINUED | OUTPATIENT
Start: 2023-05-11 | End: 2023-05-13

## 2023-05-11 RX ORDER — AMMONIA INHALANTS 0.04 G/.3ML
0.3 INHALANT RESPIRATORY (INHALATION) AS NEEDED
Status: DISCONTINUED | OUTPATIENT
Start: 2023-05-11 | End: 2023-05-13

## 2023-05-11 RX ORDER — SIMETHICONE 80 MG
80 TABLET,CHEWABLE ORAL 3 TIMES DAILY PRN
Status: DISCONTINUED | OUTPATIENT
Start: 2023-05-11 | End: 2023-05-13

## 2023-05-11 RX ORDER — ACETAMINOPHEN 500 MG
1000 TABLET ORAL EVERY 6 HOURS PRN
Status: DISCONTINUED | OUTPATIENT
Start: 2023-05-11 | End: 2023-05-13

## 2023-05-11 RX ORDER — ACETAMINOPHEN 500 MG
500 TABLET ORAL EVERY 6 HOURS PRN
Status: DISCONTINUED | OUTPATIENT
Start: 2023-05-11 | End: 2023-05-13

## 2023-05-11 NOTE — L&D DELIVERY NOTE
Roselia Pelletier [[id number]]    Labor Events     labor?: Yes  Antibiotics received during labor?: Yes  Antibiotics (enter # doses in comment): ampicillin  Rupture date/time: 5/10/2023 1145     Rupture type: SROM  Fluid color: Clear  Augmentation: Oxytocin  Indications for augmentation: Prolonged ROM  Intrapartum & labor complications:  labor  Intrapartum & labor complications comment: PROM      Presentation    No data filed     Operative Delivery    No data filed     Shoulder Dystocia    No data filed     Anesthesia    No data filed     [place] Delivery    Head delivery date/time: 2023 16:13:27   Delivery date/time:  23 15:13:00     Details:           Delivery Providers    Delivering Clinician: Jessica Shields DO   Delivery personnel:  Provider Role    Baby Nurse   Martha Burns RN Delivery Nurse         Cord    No data filed     [place] Measurements    Weight: 3670 g 8 lb 1.5 oz          Placenta    Date/time: 2023 1617  Removal: Spontaneous  Appearance: Intact     Apgars    No data filed     Skin to Skin    No data filed     Vaginal Count    No data filed     Delivery (Maternal)    No data filed          [facility]   Vaginal Delivery Procedure Note      Velia Amaya Patient Status:  Inpatient    1990 MRN [id number]   Location [address] Attending Karl Bauer MD   Hosp Day # 1 PCP Amparo Varela MD     Date of Delivery: 23    Pre procedure diagnosis:  IUP, PPROM 34w6d    Post procedure diagnosis: Same, delivered    Procedure: Normal Spontaneous Vaginal Delivery    Attending: Aimee Padilla DO      Anesthesia: Nitrous    EBL:  250 mL    Indications:  Patient is a 28year old  at 34w7d who presented with  premature ROM. She received 2 doses of betamethasone, and multiple doses of ampicillin prophylaxis.  Pitocin was started at 24 hrs of ROM, she then progressed to complete cervical dilation .      Findings:    Sex: male     Weight:3670 g      Apgars: 9/9      Lacerations: no perineal laceration, no periurethral, no cervical, bilateral upper labial abrasions, hemostatic with no repair necessary      Procedure: The patient was confirmed to be completely dilated. The patients was placed in the dorsolithotomy position. She was then encouraged to push. As the head was delivered in LYNNE position, the perineum was supported to decrease the risk of tearing. The shoulders rotated spontaneously and delivery was completed without complication. The cord was doubly clamped then cut after 60 seconds. The baby was placed on the mother's abdomen. IV pitocin bolus was initiated. A segment of the umbilical cord was obtained for cord gas analysis. The cord blood was sampled. The placenta then delivered intact. The uterus was noted to be firm. Good hemostasis was noted. The perineum, vaginal mucosa and cervix was then examined. Bleeding was minimal.  The patient was then moved to the supine position in stable condition. Sponge, and instrument counts were correct. Complications:  None     Mother and infant in good condition in the delivery room. [facility]     Brodie Phoenix DO  [address] [facility]

## 2023-05-11 NOTE — PLAN OF CARE
Problem: BIRTH - VAGINAL/ SECTION  Goal: Fetal and maternal status remain reassuring during the birth process  Description: INTERVENTIONS:  - Monitor vital signs  - Monitor fetal heart rate  - Monitor uterine activity  - Monitor labor progression (vaginal delivery)  - DVT prophylaxis (C/S delivery)  - Surgical antibiotic prophylaxis (C/S delivery)  Outcome: Progressing     Problem: PAIN - ADULT  Goal: Verbalizes/displays adequate comfort level or patient's stated pain goal  Description: INTERVENTIONS:  - Encourage pt to monitor pain and request assistance  - Assess pain using appropriate pain scale  - Administer analgesics based on type and severity of pain and evaluate response  - Implement non-pharmacological measures as appropriate and evaluate response  - Consider cultural and social influences on pain and pain management  - Manage/alleviate anxiety  - Utilize distraction and/or relaxation techniques  - Monitor for opioid side effects  - Notify MD/LIP if interventions unsuccessful or patient reports new pain  - Anticipate increased pain with activity and pre-medicate as appropriate  Outcome: Progressing     Problem: ANXIETY  Goal: Will report anxiety at manageable levels  Description: INTERVENTIONS:  - Administer medication as ordered  - Teach and rehearse alternative coping skills  - Provide emotional support with 1:1 interaction with staff  Outcome: Progressing     Problem: Patient Centered Care  Goal: Patient preferences are identified and integrated in the patient's plan of care  Description: Interventions:  - What would you like us to know as we care for you? Having a baby boy.   - Provide timely, complete, and accurate information to patient/family  - Incorporate patient and family knowledge, values, beliefs, and cultural backgrounds into the planning and delivery of care  - Encourage patient/family to participate in care and decision-making at the level they choose  - Honor patient and family perspectives and choices  Outcome: Progressing     Problem: Patient/Family Goals  Goal: Patient/Family Long Term Goal  Description: Patient's Long Term Goal: Healthy baby and healthy mother via vaginal delivery. Interventions:  - See additional Care Plan goals for specific interventions  Outcome: Progressing  Goal: Patient/Family Short Term Goal  Description: Patient's Short Term Goal: Pain management    Interventions:   - Pt desires to try nitrous oxide first. Open to epidural. Does NOT want IV analgesia.    - See additional Care Plan goals for specific interventions  Outcome: Progressing

## 2023-05-11 NOTE — NST
Progress Note:     FHR review. Baseline 120 moderate variability, + accelerations, no decelerations. Reviewed tracing history. Dr. Dhara Freeman MD    [facility] 10 OBGYN     This note was created by [facility] voice recognition. Errors in content may be related to improper recognition by the system; efforts to review and correct have been done but errors may still exist. Please be advised the primary purpose of this note is for me to communicate medical care. Standard sentence structure is not always used. Medical terminology and medical abbreviations may be used. There may be grammatical, typographical, and automated fill ins that may have errors missed in proofreading.

## 2023-05-11 NOTE — PROGRESS NOTES
Nurse called bc patient declining pitocin at this time. Discussed with patient the standard of care is to deliver and not delay for btmz if >34weeks EGA. Patient is concerned that it has been <24 hours since her bag broke. As she has mild contractions, no fundal pain, no fever, and FHT reassuring, is ok to wait until 24 hours after membranes ruptured. Plan to start pitocin at noon. Will likely still be pregnant at 2pm for  next btmz shot. Patient agrees to this plan.      Dahlia Dooley, DO

## 2023-05-12 LAB
BASOPHILS # BLD AUTO: 0.05 X10(3) UL (ref 0–0.2)
BASOPHILS NFR BLD AUTO: 0.2 %
DEPRECATED RDW RBC AUTO: 43 FL (ref 35.1–46.3)
EOSINOPHIL # BLD AUTO: 0 X10(3) UL (ref 0–0.7)
EOSINOPHIL NFR BLD AUTO: 0 %
ERYTHROCYTE [DISTWIDTH] IN BLOOD BY AUTOMATED COUNT: 13.2 % (ref 11–15)
GROUP B STREP BY PCR FOR PCR OVT: NEGATIVE
HCT VFR BLD AUTO: 31.4 %
HGB BLD-MCNC: 10.8 G/DL
IMM GRANULOCYTES # BLD AUTO: 0.43 X10(3) UL (ref 0–1)
IMM GRANULOCYTES NFR BLD: 2 %
LYMPHOCYTES # BLD AUTO: 2.87 X10(3) UL (ref 1–4)
LYMPHOCYTES NFR BLD AUTO: 13.2 %
MCH RBC QN AUTO: 30.6 PG (ref 26–34)
MCHC RBC AUTO-ENTMCNC: 34.4 G/DL (ref 31–37)
MCV RBC AUTO: 89 FL
MONOCYTES # BLD AUTO: 1.04 X10(3) UL (ref 0.1–1)
MONOCYTES NFR BLD AUTO: 4.8 %
NEUTROPHILS # BLD AUTO: 17.41 X10 (3) UL (ref 1.5–7.7)
NEUTROPHILS # BLD AUTO: 17.41 X10(3) UL (ref 1.5–7.7)
NEUTROPHILS NFR BLD AUTO: 79.8 %
PLATELET # BLD AUTO: 251 10(3)UL (ref 150–450)
RBC # BLD AUTO: 3.53 X10(6)UL
WBC # BLD AUTO: 21.8 X10(3) UL (ref 4–11)

## 2023-05-12 NOTE — PROGRESS NOTES
Patient up to bathroom with assist x 2. Voided 500. Patient transferred to mother/baby room 369 per wheelchair in stable condition with baby and personal belongings. Accompanied by significant other and staff. Report given to mother/baby RN Mindi

## 2023-05-12 NOTE — CM/SW NOTE
MD order received regarding SDOH- this was address by pt's nurse and resources provided.      [phone]

## 2023-05-12 NOTE — LACTATION NOTE
05/12/23 2866   Evaluation Type   Evaluation Type Inpatient   Problems identified   Problems identified Knowledge deficit;Milk supply not WNL   Milk supply not WNL Reduced (potential)   Problems Identified Other 34 week premature infant in SCN   Maternal history   Maternal history Gestational diabetes   Breastfeeding goal   Breastfeeding goal To maintain breast milk feeding per patient goal   Maternal Assessment   Bilateral Breasts Soft;Symmetrical   Bilateral Nipples Colostrum easily expressed;Elastic;Erythema; Everted   Prior breastfeeding experience (comment below) Primip   Breastfeeding Assistance Breastfeeding assistance provided with permission   Pain assessment   Pain, additional Pain location   Pain Location Nipples   Location/Comment irritated from hand expression   Treatment of Sore Nipples Lanolin;Expressed breast milk;Coconut oil   Guidelines for use of:   Equipment Lanolin   Breast pump type Ameda Platinum;Spectra   Current use of pump: X3 in past 16 hours   Suggested use of pump Pump 8-12X/24hr   Reported pumping volumes (ml) gtts - 8 ml   Post-feed pumped volume 4ml   Other (comment) [facility] packet on creating milk supply for infant in [facility] provided and discussed with parents. Pt had wished to exclusively breastfeed and is very motivated to [employer] supply for infant and wean him off formula. Pumping session observed, size 25 flanges are approriate fit and pt denies pumping discomfort; pumping settings reviewed. Demonstrated hand expression and use of manual pump if pt desires after using el;electric pump, collection, storage, labeling of expressed milk. Encouraged skin to skin, nuzzling, latch attempts and pumping at bedside in NICU. ETC lactation if having pumping discomfort or with latch assistance.

## 2023-05-12 NOTE — PROGRESS NOTES
Pt transferred to room 369 in stable condition. Received report from Rosalba Miller, L&D RN. VSS, afebrile. Fundus is firm U/1, bleeding is scant. Plan of care reviewed with pt. No questions at this time. Will continue with plan of care.

## 2023-05-12 NOTE — PLAN OF CARE
Problem: Patient Centered Care  Goal: Patient preferences are identified and integrated in the patient's plan of care  Description: Interventions:  - What would you like us to know as we care for you? Patient goes by [employer]" Baby boy Stellios in special care nursery 8.  - Provide timely, complete, and accurate information to patient/family  - Incorporate patient and family knowledge, values, beliefs, and cultural backgrounds into the planning and delivery of care  - Encourage patient/family to participate in care and decision-making at the level they choose  - Honor patient and family perspectives and choices  Outcome: Progressing     Problem: Patient/Family Goals  Goal: Patient/Family Long Term Goal  Description: Patient's Long Term Goal: Healthy postpartum progression    Interventions:  - Lactation support as needed, discussed infant's feeding schedule, protecting your supply with pumping  - Timely assessments  - Pain control per [facility], discussed with patient  - Bleeding control  - Updated per patient and family preferences   - See additional Care Plan goals for specific interventions  Outcome: Progressing  Goal: Patient/Family Short Term Goal  Description: Patient's Short Term Goal: Visit infant in special care nursery, breastfeed    Interventions:   - See above  - See additional Care Plan goals for specific interventions  Outcome: Progressing     Problem: POSTPARTUM  Goal: Long Term Goal:Experiences normal postpartum course  Description: INTERVENTIONS:  - Assess and monitor vital signs and lab values. - Assess fundus and lochia. - Provide ice/sitz baths for perineum discomfort. - Monitor healing of incision/episiotomy/laceration, and assess for signs and symptoms of infection and hematoma. - Assess bladder function and monitor for bladder distention.  - Provide/instruct/assist with pericare as needed. - Provide VTE prophylaxis as needed.   - Monitor bowel function.  - Encourage ambulation and provide assistance as needed. - Assess and monitor emotional status and provide social service/psych resources as needed. - Utilize standard precautions and use personal protective equipment as indicated. Ensure aseptic care of all intravenous lines and invasive tubes/drains.  - Obtain immunization and exposure to communicable diseases history. Outcome: Progressing  Goal: Optimize infant feeding at the breast  Description: INTERVENTIONS:  - Initiate breast feeding within first hour after birth. - Monitor effectiveness of current breast feeding efforts. - Assess support systems available to mother/family.  - Identify cultural beliefs/practices regarding lactation, letdown techniques, maternal food preferences. - Assess mother's knowledge and previous experience with breast feeding.  - Provide information as needed about early infant feeding cues (e.g., rooting, lip smacking, sucking fingers/hand) versus late cue of crying.  - Discuss/demonstrate breast feeding aids (e.g., infant sling, nursing footstool/pillows, and breast pumps). - Encourage mother/other family members to express feelings/concerns, and actively listen. - Educate father/SO about benefits of breast feeding and how to manage common lactation challenges. - Recommend avoidance of specific medications or substances incompatible with breast feeding.  - Assess and monitor for signs of nipple pain/trauma. - Instruct and provide assistance with proper latch. - Review techniques for milk expression (breast pumping) and storage of breast milk. Provide pumping equipment/supplies, instructions and assistance, as needed. - Encourage rooming-in and breast feeding on demand.  - Encourage skin-to-skin contact. - Provide LC support as needed. - Assess for and manage engorgement. - Provide breast feeding education handouts and information on community breast feeding support.    Outcome: Progressing  Goal: Establishment of adequate milk supply with medication/procedure interruptions  Description: INTERVENTIONS:  - Review techniques for milk expression (breast pumping). - Provide pumping equipment/supplies, instructions, and assistance until it is safe to breastfeed infant. Outcome: Progressing  Goal: Appropriate maternal -  bonding  Description: INTERVENTIONS:  - Assess caregiver- interactions. - Assess caregiver's emotional status and coping mechanisms. - Encourage caregiver to participate in  daily care. - Assess support systems available to mother/family.  - Provide /case management support as needed.   Outcome: Progressing

## 2023-05-13 VITALS
OXYGEN SATURATION: 99 % | TEMPERATURE: 99 F | DIASTOLIC BLOOD PRESSURE: 72 MMHG | SYSTOLIC BLOOD PRESSURE: 101 MMHG | HEART RATE: 74 BPM | RESPIRATION RATE: 16 BRPM

## 2023-05-13 RX ORDER — DOCUSATE SODIUM 100 MG/1
100 CAPSULE, LIQUID FILLED ORAL 2 TIMES DAILY
Qty: 60 CAPSULE | Refills: 0 | Status: SHIPPED | OUTPATIENT
Start: 2023-05-13 | End: 2023-06-12

## 2023-05-13 RX ORDER — ACETAMINOPHEN 325 MG/1
325 TABLET ORAL EVERY 6 HOURS PRN
Qty: 60 TABLET | Refills: 0 | Status: SHIPPED | OUTPATIENT
Start: 2023-05-13

## 2023-05-13 RX ORDER — IBUPROFEN 600 MG/1
600 TABLET ORAL EVERY 6 HOURS PRN
Qty: 60 TABLET | Refills: 0 | Status: SHIPPED | OUTPATIENT
Start: 2023-05-13

## 2023-05-13 RX ORDER — POLYETHYLENE GLYCOL 3350 17 G/17G
17 POWDER, FOR SOLUTION ORAL 2 TIMES DAILY PRN
Qty: 60 EACH | Refills: 0 | Status: SHIPPED | OUTPATIENT
Start: 2023-05-13 | End: 2023-06-12

## 2023-05-13 NOTE — PLAN OF CARE
Problem: POSTPARTUM  Goal: Long Term Goal:Experiences normal postpartum course  Description: INTERVENTIONS:  - Assess and monitor vital signs and lab values. - Assess fundus and lochia. - Provide ice/sitz baths for perineum discomfort. - Monitor healing of incision/episiotomy/laceration, and assess for signs and symptoms of infection and hematoma. - Assess bladder function and monitor for bladder distention.  - Provide/instruct/assist with pericare as needed. - Provide VTE prophylaxis as needed. - Monitor bowel function.  - Encourage ambulation and provide assistance as needed. - Assess and monitor emotional status and provide social service/psych resources as needed. - Utilize standard precautions and use personal protective equipment as indicated. Ensure aseptic care of all intravenous lines and invasive tubes/drains.  - Obtain immunization and exposure to communicable diseases history. Outcome: Completed  Goal: Optimize infant feeding at the breast  Description: INTERVENTIONS:  - Initiate breast feeding within first hour after birth. - Monitor effectiveness of current breast feeding efforts. - Assess support systems available to mother/family.  - Identify cultural beliefs/practices regarding lactation, letdown techniques, maternal food preferences. - Assess mother's knowledge and previous experience with breast feeding.  - Provide information as needed about early infant feeding cues (e.g., rooting, lip smacking, sucking fingers/hand) versus late cue of crying.  - Discuss/demonstrate breast feeding aids (e.g., infant sling, nursing footstool/pillows, and breast pumps). - Encourage mother/other family members to express feelings/concerns, and actively listen. - Educate father/SO about benefits of breast feeding and how to manage common lactation challenges.   - Recommend avoidance of specific medications or substances incompatible with breast feeding.  - Assess and monitor for signs of nipple pain/trauma. - Instruct and provide assistance with proper latch. - Review techniques for milk expression (breast pumping) and storage of breast milk. Provide pumping equipment/supplies, instructions and assistance, as needed. - Encourage rooming-in and breast feeding on demand.  - Encourage skin-to-skin contact. - Provide LC support as needed. - Assess for and manage engorgement. - Provide breast feeding education handouts and information on community breast feeding support. Outcome: Completed  Goal: Establishment of adequate milk supply with medication/procedure interruptions  Description: INTERVENTIONS:  - Review techniques for milk expression (breast pumping). - Provide pumping equipment/supplies, instructions, and assistance until it is safe to breastfeed infant. Outcome: Completed  Goal: Appropriate maternal -  bonding  Description: INTERVENTIONS:  - Assess caregiver- interactions. - Assess caregiver's emotional status and coping mechanisms. - Encourage caregiver to participate in  daily care. - Assess support systems available to mother/family.  - Provide /case management support as needed.   Outcome: Completed     Problem: Patient/Family Goals  Goal: Patient/Family Long Term Goal  Description: Patient's Long Term Goal:    Interventions:  -   - See additional Care Plan goals for specific interventions  Outcome: Completed  Goal: Patient/Family Short Term Goal  Description: Patient's Short Term Goal:     Interventions:   -   - See additional Care Plan goals for specific interventions  Outcome: Completed     Problem: Patient Centered Care  Goal: Patient preferences are identified and integrated in the patient's plan of care  Description: Interventions:  - What would you like us to know as we care for you?   - Provide timely, complete, and accurate information to patient/family  - Incorporate patient and family knowledge, values, beliefs, and cultural backgrounds into the planning and delivery of care  - Encourage patient/family to participate in care and decision-making at the level they choose  - Honor patient and family perspectives and choices  Outcome: Completed

## 2023-05-13 NOTE — BH PROGRESS NOTE
[facility] PPD SCREENING    Reason for Referral: EPDS = 12    Current Stressors:    History of PPD: No   Financial: None reported. Other: Pt's only identified stressor is baby being born at 29 weeks. Pt reported labor was smooth but concerned about keeping baby healthy. Mental Health Status:    Current Symptoms: Pt reported feeling overwhelmed. Appearance/General Behavior: Appearance and hygiene normal and appropriate. Psychomotor movement normal.     General Cognitive Functioning: Normal   Thought Process: Linear and Organized   Thought Content: Appropriate. Pt denied any experience of hallucinations or paranoia. Mood/Affect: Pt identified her mood as overwhelmed. Pt's affect tearful. Orientation: Pt alert and oriented X3. Speech: Speech normal in rate, tone, and volume. Homicidal/Suicidal Ideation/Plan: Pt denied any experience of SI or HI. Living Arrangement:    Who Resides at Home: Pt lives at home with her . Has other Children: No   Is Father of the Baby involved: Yes;  is baby's father. Has Familial Support: Pt reported having large familial (paternal and maternal) support network. Has Other Support Network: Pt identified her friends as additional support. Plan:    Provide PPD Information:     Postpartum Depression Resources Given: Pt given [facility]  Mood & Anxiety Disorders information. [employer] Information Given: Yes    Depression During and After Pregnancy Information given: Yes   Provide [facility] Contact Information: Yes   Other Information Given: [facility] Women's Post-Partum Depression and Anxiety [facility] met with pt to f/u on EPDS. Pt alert and oriented X3. Pt reported feeling overwhelmed due to baby being born prematurely at 29 weeks and 6 days. Pt is being discharged, but baby to remain at hospital for 2 additional weeks. Pt and  to alternate staying with baby.   Pt denied any hx of psychiatric diagnosis, outpatient MH tx, psychiatric hospitalization, or psychotropic medication. Pt identified having large support system. Pt provided with resources for f/u if needed.       Aida Martinez LCSW  [facility]

## 2023-05-13 NOTE — PLAN OF CARE
Problem: Patient Centered Care  Goal: Patient preferences are identified and integrated in the patient's plan of care  Description: Interventions:  - What would you like us to know as we care for you?   - Provide timely, complete, and accurate information to patient/family  - Incorporate patient and family knowledge, values, beliefs, and cultural backgrounds into the planning and delivery of care  - Encourage patient/family to participate in care and decision-making at the level they choose  - Honor patient and family perspectives and choices  Outcome: Progressing     Problem: Patient/Family Goals  Goal: Patient/Family Long Term Goal  Description: Patient's Long Term Goal:     Interventions:  -   - See additional Care Plan goals for specific interventions  Outcome: Progressing  Goal: Patient/Family Short Term Goal  Description: Patient's Short Term Goal:     Interventions:   -  - See additional Care Plan goals for specific interventions  Outcome: Progressing     Problem: POSTPARTUM  Goal: Long Term Goal:Experiences normal postpartum course  Description: INTERVENTIONS:  - Assess and monitor vital signs and lab values. - Assess fundus and lochia. - Provide ice/sitz baths for perineum discomfort. - Monitor healing of incision/episiotomy/laceration, and assess for signs and symptoms of infection and hematoma. - Assess bladder function and monitor for bladder distention.  - Provide/instruct/assist with pericare as needed. - Provide VTE prophylaxis as needed. - Monitor bowel function.  - Encourage ambulation and provide assistance as needed. - Assess and monitor emotional status and provide social service/psych resources as needed. - Utilize standard precautions and use personal protective equipment as indicated. Ensure aseptic care of all intravenous lines and invasive tubes/drains.  - Obtain immunization and exposure to communicable diseases history.   Outcome: Progressing  Goal: Optimize infant feeding at the breast  Description: INTERVENTIONS:  - Initiate breast feeding within first hour after birth. - Monitor effectiveness of current breast feeding efforts. - Assess support systems available to mother/family.  - Identify cultural beliefs/practices regarding lactation, letdown techniques, maternal food preferences. - Assess mother's knowledge and previous experience with breast feeding.  - Provide information as needed about early infant feeding cues (e.g., rooting, lip smacking, sucking fingers/hand) versus late cue of crying.  - Discuss/demonstrate breast feeding aids (e.g., infant sling, nursing footstool/pillows, and breast pumps). - Encourage mother/other family members to express feelings/concerns, and actively listen. - Educate father/SO about benefits of breast feeding and how to manage common lactation challenges. - Recommend avoidance of specific medications or substances incompatible with breast feeding.  - Assess and monitor for signs of nipple pain/trauma. - Instruct and provide assistance with proper latch. - Review techniques for milk expression (breast pumping) and storage of breast milk. Provide pumping equipment/supplies, instructions and assistance, as needed. - Encourage rooming-in and breast feeding on demand.  - Encourage skin-to-skin contact. - Provide LC support as needed. - Assess for and manage engorgement. - Provide breast feeding education handouts and information on community breast feeding support. Outcome: Progressing  Goal: Establishment of adequate milk supply with medication/procedure interruptions  Description: INTERVENTIONS:  - Review techniques for milk expression (breast pumping). - Provide pumping equipment/supplies, instructions, and assistance until it is safe to breastfeed infant. Outcome: Progressing  Goal: Appropriate maternal -  bonding  Description: INTERVENTIONS:  - Assess caregiver- interactions.   - Assess caregiver's emotional status and coping mechanisms. - Encourage caregiver to participate in  daily care. - Assess support systems available to mother/family.  - Provide /case management support as needed. Outcome: Progressing     VSS, afebrile. Voiding freely. Fundus is firm, U/1, bleeding is small. Plan of care reviewed with pt. Questions and concerns answered. Bonding well with baby. Will continue with plan of care.

## 2023-05-13 NOTE — PROGRESS NOTES
Discharge order received from MD.     Discharge instructions and medications reviewed with patient. ID band matched with baby band. Follow up instructions with OB given. Mother verbalizes understanding of instructions.

## 2023-05-23 ENCOUNTER — TELEPHONE (OUTPATIENT)
Dept: OBGYN CLINIC | Facility: CLINIC | Age: 33
End: 2023-05-23

## 2023-06-13 ENCOUNTER — TELEPHONE (OUTPATIENT)
Dept: OBGYN CLINIC | Facility: CLINIC | Age: 33
End: 2023-06-13

## 2023-06-13 NOTE — TELEPHONE ENCOUNTER
Patient is calling requesting to speak to RN regarding concerns. Patient stated she is currently bleeding is 5 weeks post partum, she stated the bleeding has become more the passed couple of days and is also seeing blood in her stool is not sure. Please follow up with patient.

## 2023-06-16 ENCOUNTER — TELEPHONE (OUTPATIENT)
Dept: OBGYN UNIT | Facility: HOSPITAL | Age: 33
End: 2023-06-16

## 2023-06-19 ENCOUNTER — LAB ENCOUNTER (OUTPATIENT)
Dept: LAB | Facility: REFERENCE LAB | Age: 33
End: 2023-06-19
Attending: OBSTETRICS & GYNECOLOGY
Payer: COMMERCIAL

## 2023-06-19 ENCOUNTER — POSTPARTUM (OUTPATIENT)
Dept: OBGYN CLINIC | Facility: CLINIC | Age: 33
End: 2023-06-19
Payer: COMMERCIAL

## 2023-06-19 VITALS — DIASTOLIC BLOOD PRESSURE: 62 MMHG | HEIGHT: 65 IN | BODY MASS INDEX: 27 KG/M2 | SYSTOLIC BLOOD PRESSURE: 94 MMHG

## 2023-06-19 DIAGNOSIS — Z86.32 HISTORY OF GESTATIONAL DIABETES: ICD-10-CM

## 2023-06-19 LAB
EST. AVERAGE GLUCOSE BLD GHB EST-MCNC: 91 MG/DL (ref 68–126)
HBA1C MFR BLD: 4.8 % (ref ?–5.7)

## 2023-06-19 PROCEDURE — 83036 HEMOGLOBIN GLYCOSYLATED A1C: CPT | Performed by: OBSTETRICS & GYNECOLOGY

## 2023-07-20 ENCOUNTER — OFFICE VISIT (OUTPATIENT)
Facility: CLINIC | Age: 33
End: 2023-07-20
Payer: COMMERCIAL

## 2023-07-20 VITALS
HEIGHT: 65 IN | OXYGEN SATURATION: 99 % | BODY MASS INDEX: 23.19 KG/M2 | SYSTOLIC BLOOD PRESSURE: 110 MMHG | HEART RATE: 76 BPM | WEIGHT: 139.19 LBS | DIASTOLIC BLOOD PRESSURE: 70 MMHG

## 2023-07-20 DIAGNOSIS — K62.5 BRBPR (BRIGHT RED BLOOD PER RECTUM): ICD-10-CM

## 2023-07-20 DIAGNOSIS — M25.511 CHRONIC RIGHT SHOULDER PAIN: ICD-10-CM

## 2023-07-20 DIAGNOSIS — G89.29 CHRONIC RIGHT SHOULDER PAIN: ICD-10-CM

## 2023-07-20 DIAGNOSIS — Z00.00 PHYSICAL EXAM, ANNUAL: Primary | ICD-10-CM

## 2023-07-20 PROCEDURE — 3008F BODY MASS INDEX DOCD: CPT | Performed by: FAMILY MEDICINE

## 2023-07-20 PROCEDURE — 3078F DIAST BP <80 MM HG: CPT | Performed by: FAMILY MEDICINE

## 2023-07-20 PROCEDURE — 99385 PREV VISIT NEW AGE 18-39: CPT | Performed by: FAMILY MEDICINE

## 2023-07-20 PROCEDURE — 99203 OFFICE O/P NEW LOW 30 MIN: CPT | Performed by: FAMILY MEDICINE

## 2023-07-20 PROCEDURE — 3074F SYST BP LT 130 MM HG: CPT | Performed by: FAMILY MEDICINE

## 2025-01-15 ENCOUNTER — OFFICE VISIT (OUTPATIENT)
Dept: OBGYN CLINIC | Facility: CLINIC | Age: 35
End: 2025-01-15
Payer: COMMERCIAL

## 2025-01-15 VITALS
SYSTOLIC BLOOD PRESSURE: 106 MMHG | WEIGHT: 128 LBS | BODY MASS INDEX: 21.33 KG/M2 | DIASTOLIC BLOOD PRESSURE: 68 MMHG | HEIGHT: 65 IN

## 2025-01-15 DIAGNOSIS — O09.899 HISTORY OF PRETERM DELIVERY, CURRENTLY PREGNANT (HCC): ICD-10-CM

## 2025-01-15 DIAGNOSIS — Z12.4 SCREENING FOR CERVICAL CANCER: ICD-10-CM

## 2025-01-15 DIAGNOSIS — Z32.01 PREGNANCY EXAMINATION OR TEST, POSITIVE RESULT (HCC): Primary | ICD-10-CM

## 2025-01-15 PROCEDURE — 3078F DIAST BP <80 MM HG: CPT | Performed by: OBSTETRICS & GYNECOLOGY

## 2025-01-15 PROCEDURE — 99214 OFFICE O/P EST MOD 30 MIN: CPT | Performed by: OBSTETRICS & GYNECOLOGY

## 2025-01-15 PROCEDURE — 3008F BODY MASS INDEX DOCD: CPT | Performed by: OBSTETRICS & GYNECOLOGY

## 2025-01-15 PROCEDURE — 3074F SYST BP LT 130 MM HG: CPT | Performed by: OBSTETRICS & GYNECOLOGY

## 2025-01-15 NOTE — PROGRESS NOTES
[facility]  Obstetrics and Gynecology    Subjective:     Ellie Conteh is a 34 year old ,female, Patient's last menstrual period was 2024. presents with missed menses and positive pregnancy test.   This is a planned pregnancy. Partner is involved. Has been breastfeeding. States has had menses q 25-35.   Recent contraception: none  Patient's last menstrual period was 2024. Lmp certain  No data recorded    Nausea/vomiting - Positive, mild  Breast tenderness - Positive  Vaginal discharge - Negative  Vaginal bleeding - Negative  Urinary symptoms - Negative  Taking prenatal vitamins  Feels safe at home   STD hx:     Review of Systems:  General: no complaints  Eyes: no complaints  Respiratory: no complaints  Cardiovascular: no complaints  GI: no complaints  : no complaints See HPI  Hematological/lymphatic: no complaints  Breast: no complaints  Psychiatric: no complaints  Endocrine:no complaints  Neurological: no complaints  Immunological: no complaints  Musculoskeletal:no complaints    OB History    Para Term  AB Living   2 1 0 1 0 1   SAB IAB Ectopic Multiple Live Births   0 0 0 0 1       Past Medical History:    Gestational diabetes (HCC)       Past Surgical History:   Procedure Laterality Date    Knee surgery Right        Social History     Socioeconomic History    Marital status:    Tobacco Use    Smoking status: Never    Smokeless tobacco: Never   Substance and Sexual Activity    Alcohol use: Not Currently    Drug use: Never    Sexual activity: Yes     Partners: Male   Other Topics Concern    Blood Transfusions No       Family History   Problem Relation Age of Onset    Other (Other) Mother         Fibroids    Diabetes Father     Heart Disease Maternal Grandmother         Hole in heart    Diabetes Maternal Grandfather     Other (Other) Paternal Grandmother         Alzheimer's    Diabetes Paternal Grandmother     Heart Disease Paternal  Grandfather         CHF    Diabetes Paternal Grandfather     Colon Cancer Paternal Grandfather 60    Colon Cancer Paternal Aunt 35         Current Outpatient Medications:     acetaminophen 325 MG Oral Tab, Take 1 tablet (325 mg total) by mouth every 6 (six) hours as needed for Pain. (Patient not taking: Reported on 2023), Disp: 60 tablet, Rfl: 0    prenatal vitamin with DHA 27-0.8-228 MG Oral Cap, Take 1 capsule by mouth daily., Disp: , Rfl:     magnesium 250 MG Oral Tab, Take 1 tablet (250 mg total) by mouth., Disp: , Rfl:     cholecalciferol 25 MCG (1000 UT) Oral Tab, Take 1 tablet (1,000 Units total) by mouth daily., Disp: , Rfl:       Health maintenance:  Health Maintenance   Topic Date Due    Annual Physical  2024    COVID-19 Vaccine ( season) 2024    Annual Depression Screening  2025    Pap Smear  2026    DTaP,Tdap,and Td Vaccines (9 - Td or Tdap) 2033    Influenza Vaccine  Completed    Pneumococcal Vaccine: Birth to 50yrs  Aged Out    Meningococcal B Vaccine  Aged Out        Objective:     Vitals:    01/15/25 1413   BP: 106/68   Weight: 128 lb (58.1 kg)   Height: 65\"         Body mass index is 21.3 kg/m².    GENERAL: well developed, well nourished, in no apparent distress, alert and orientated X 3  PSYCH: mood and affect stable   SKIN: no rashes, no lesions  HEENT: normal  LUNGS: respiration unlabored  CARDIOVASCULAR: no peripheral edema or varicosities, skin warm and dry      ABDOMEN: Soft, non distended; non tender, no masses  GYNE/: deferred  EXTREMITIES:  Normal range of motion, strength 5/5, nontender without edema    Labs:  POC urine pregnancy test : Positive     Imaging:  Bed side ultrasound: SLIUP   Appears to have bicornuate uterus with possible second gestational sac vs subchorionic hemorrhage. 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 Pregnancy examination or test, positive result ([facility])  Z32.01           Patient's last menstrual period was 2024. at 9/5weeks, EDC 8/15/25 by last menstrual period  EDC by today's US - 25    EGA  by last menstrual period c/w lmp  Missed menses  - positive pregnancy test  - repeat US ordered to reevaluate possible bicornuate uterus/twin pregnancy   - Pt counseled on safety, diet, exercise, caffiene, tobacco, ETOH, sexual activity, ER precautions, diet, exercise, appropriate otc medication use, substance abuse   - Counseled on taking a PNV with folic acid   - genetic screening testing d/w patient and family   -  has had carrier screening in her past for CF, SMA, and hemoglobinopathies   - advised to follow up to establish prenatal care - referred for RN education visit  - SAB precautions provided   - d/w nausea and vomiting in pregnancy including vitamin B 6  - flu vaccines - UTD    - will need early glucola  - repeat ultrasound 1 week to evaluate today's ultrasound findings  - referred for MFM consult given history  delivery. Concerned for this pregnancy and need for tertiary care center. Would prefer [facility]. May consider transfer of care. Will have MFM consult and weigh her options,    All of the findings and plan were discussed with the patient.  She notes understanding and agrees with the plan of care.  All questions were answered to the best of my ability at this time.        RTC in 1 weeks or  sooner if needed     MD HEATHER Bean

## 2025-01-16 ENCOUNTER — PATIENT MESSAGE (OUTPATIENT)
Dept: OBGYN CLINIC | Facility: CLINIC | Age: 35
End: 2025-01-16

## 2025-01-22 ENCOUNTER — ULTRASOUND ENCOUNTER (OUTPATIENT)
Dept: OBGYN CLINIC | Facility: CLINIC | Age: 35
End: 2025-01-22
Payer: COMMERCIAL

## 2025-01-22 DIAGNOSIS — Z32.01 PREGNANCY EXAMINATION OR TEST, POSITIVE RESULT (HCC): ICD-10-CM

## 2025-01-22 PROCEDURE — 76801 OB US < 14 WKS SINGLE FETUS: CPT | Performed by: OBSTETRICS & GYNECOLOGY

## 2025-01-23 DIAGNOSIS — O28.3 ABNORMAL ULTRASONIC FINDING ON ANTENATAL SCREENING OF MOTHER, ANTEPARTUM: Primary | ICD-10-CM

## 2025-01-28 ENCOUNTER — TELEPHONE (OUTPATIENT)
Dept: OBGYN CLINIC | Facility: CLINIC | Age: 35
End: 2025-01-28

## 2025-01-28 NOTE — TELEPHONE ENCOUNTER
Received request for order from [facility].  Pt is scheduled with MFM at [facility] on 02/03 amd [facility] on 02/12/2025.  Pt has also schedule with new OB group at [facility] therefore needs order from [facility].     Left message for pt to cancel [facility] or [facility].

## 2025-02-04 ENCOUNTER — PATIENT MESSAGE (OUTPATIENT)
Dept: OBGYN CLINIC | Facility: CLINIC | Age: 35
End: 2025-02-04

## 2025-03-02 ENCOUNTER — HOSPITAL ENCOUNTER (OUTPATIENT)
Age: 35
Discharge: HOME | End: 2025-03-02
Payer: MEDICAID

## 2025-03-02 VITALS
SYSTOLIC BLOOD PRESSURE: 111 MMHG | TEMPERATURE: 100 F | RESPIRATION RATE: 20 BRPM | HEART RATE: 111 BPM | DIASTOLIC BLOOD PRESSURE: 70 MMHG | OXYGEN SATURATION: 99 %

## 2025-03-02 DIAGNOSIS — J01.10 ACUTE NON-RECURRENT FRONTAL SINUSITIS: Primary | ICD-10-CM

## 2025-03-02 LAB — S PYO AG THROAT QL: NEGATIVE

## 2025-03-02 RX ORDER — AMOXICILLIN 875 MG/1
875 TABLET, COATED ORAL 2 TIMES DAILY
Qty: 14 TABLET | Refills: 0 | Status: SHIPPED | OUTPATIENT
Start: 2025-03-02 | End: 2025-03-09

## 2025-03-02 NOTE — ED PROVIDER NOTES
Patient Seen in: [facility]      History     Chief Complaint   Patient presents with    Sinus Problem     Intense sinus pressure, headaches, sore throat and neck pain for the past week - Entered by patient     Stated Complaint: Sinus Problem - Intense sinus pressure, headaches, sore throat and neck pain fo*    Subjective:   HPI  Patient is a 34-year-old female who presents to the immediate care center with a concern for sinus pain and pressure that has been persistent for 1 week.  She had getting sore throat and congestion that isolated to the sinuses.  Denies known illness exposure.  She denies all recent trauma.  She has had no headache or dizziness.  Reports being 16 weeks pregnant, having healthy pregnancy to date.  She has been following with her obstetrician without any complications.  She has had no pelvic, abdominal, or back pain; no vaginal bleeding or discharge.          Objective:     Past Medical History:    Gestational diabetes (HCC)              Past Surgical History:   Procedure Laterality Date    Knee surgery Right 2014                Social History     Socioeconomic History    Marital status:    Tobacco Use    Smoking status: Never    Smokeless tobacco: Never   Substance and Sexual Activity    Alcohol use: Not Currently    Drug use: Never    Sexual activity: Yes     Partners: Male   Other Topics Concern    Blood Transfusions No     Social Drivers of Health     Food Insecurity: No Food Insecurity (2/26/2025)    Received from [facility]    Food Insecurity     Currently or in the past 3 months, have you worried your food would run out before you had money to buy more?: No     In the past 12 months, have you run out of food or been unable to get more?: No   Transportation Needs: No Transportation Needs (2/26/2025)    Received from [facility]    Transportation Needs     Currently or in the past 3 months, has lack of transportation kept you  from medical appointments, getting food or medicine, or providing care to a family member?: Unrecognized value     Medical Transportation Needs?: No   Stress: No Stress Concern Present (5/10/2023)    Stress     Feeling of Stress : No   Housing Stability: Low Risk  (5/10/2023)    Housing Stability     Housing Instability: No              Review of Systems   Constitutional:  Negative for appetite change, chills and fever.   HENT:  Positive for congestion, sinus pressure and sore throat.    Respiratory:  Negative for cough.    Gastrointestinal:  Negative for abdominal pain.   Musculoskeletal:  Negative for arthralgias and myalgias.   Skin:  Negative for rash.   Neurological:  Negative for dizziness, weakness and headaches.       Positive for stated complaint: Sinus Problem - Intense sinus pressure, headaches, sore throat and neck pain fo*  Other systems are as noted in HPI.  Constitutional and vital signs reviewed.      All other systems reviewed and negative except as noted above.    Physical Exam     ED Triage Vitals [03/02/25 1127]   /70   Pulse 111   Resp 20   Temp 100 °F (37.8 °C)   Temp src Oral   SpO2 99 %   O2 Device None (Room air)       Current Vitals:   Vital Signs  BP: 111/70  Pulse: 111  Resp: 20  Temp: 100 °F (37.8 °C)  Temp src: Oral    Oxygen Therapy  SpO2: 99 %  O2 Device: None (Room air)        Physical Exam  Vitals and nursing note reviewed.   Constitutional:       General: She is not in acute distress.     Appearance: She is not ill-appearing.   HENT:      Right Ear: Tympanic membrane and ear canal normal.      Left Ear: Tympanic membrane and ear canal normal.      Nose: Nose normal.   Eyes:      Conjunctiva/sclera: Conjunctivae normal.   Pulmonary:      Effort: Pulmonary effort is normal. No respiratory distress.   Musculoskeletal:      Cervical back: Normal range of motion and neck supple.   Skin:     General: Skin is warm and dry.      Findings: No rash.   Neurological:      Mental Status:  She is alert and oriented to person, place, and time.             ED Course     Labs Reviewed   POCT RAPID STREP - Normal     FHR: 132              MDM      Patient was given a watch and wait prescription for amoxicillin.  She will hold this for the next 3 days while she begins using nasal corticosteroids as directed.  If her symptoms are not improving or if it anytime her symptoms worsen, she will complete the amoxicillin as directed.  Minded that decongestants are contraindicated while pregnant.  She will avoid use.                Medical Decision Making  Differential diagnoses considered included, but are not exclusive of: bacterial vs viral sinusitis, dehydration, pneumonia, influenza, Covid-19 infection, and other viral upper respiratory infection.       Problems Addressed:  Acute non-recurrent frontal sinusitis: self-limited or minor problem    Risk  OTC drugs.  Prescription drug management.        Disposition and Plan     Clinical Impression:  1. Acute non-recurrent frontal sinusitis         Disposition:  Discharge  3/2/2025 12:03 pm    Follow-up:  Radha Cao MD  [address] [place] [id number]  [phone]      As scheduled          Medications Prescribed:  Current Discharge Medication List        START taking these medications    Details   amoxicillin 875 MG Oral Tab Take 1 tablet (875 mg total) by mouth 2 (two) times daily for 7 days.  Qty: 14 tablet, Refills: 0                 Supplementary Documentation:

## 2025-03-02 NOTE — ED INITIAL ASSESSMENT (HPI)
Pt here with complaints of sinus pressure and sore throat that began 1 week ago pt states sinus pain is worse when she bends down , pt denies any fevers or sob , pt states she is 16 weeks pregnant